# Patient Record
Sex: FEMALE | Race: WHITE | URBAN - METROPOLITAN AREA
[De-identification: names, ages, dates, MRNs, and addresses within clinical notes are randomized per-mention and may not be internally consistent; named-entity substitution may affect disease eponyms.]

---

## 2017-02-02 LAB
ABO/RH: NORMAL
ANTIBODY SCREEN: NORMAL
HEPATITIS C ANTIBODY INTERPRETATION: NORMAL

## 2017-02-03 LAB
BASOPHILS ABSOLUTE: 0 K/UL (ref 0–0.2)
BASOPHILS RELATIVE PERCENT: 0.4 %
EOSINOPHILS ABSOLUTE: 0.1 K/UL (ref 0–0.6)
EOSINOPHILS RELATIVE PERCENT: 2.1 %
HCT VFR BLD CALC: 34.9 % (ref 36–48)
HEMOGLOBIN: 11.9 G/DL (ref 12–16)
HEPATITIS B SURFACE ANTIGEN INTERPRETATION: ABNORMAL
HIV-1 AND HIV-2 ANTIBODIES: NORMAL
LYMPHOCYTES ABSOLUTE: 1.4 K/UL (ref 1–5.1)
LYMPHOCYTES RELATIVE PERCENT: 21.7 %
MCH RBC QN AUTO: 32.7 PG (ref 26–34)
MCHC RBC AUTO-ENTMCNC: 34.2 G/DL (ref 31–36)
MCV RBC AUTO: 95.6 FL (ref 80–100)
MONOCYTES ABSOLUTE: 0.3 K/UL (ref 0–1.3)
MONOCYTES RELATIVE PERCENT: 5.2 %
NEUTROPHILS ABSOLUTE: 4.6 K/UL (ref 1.7–7.7)
NEUTROPHILS RELATIVE PERCENT: 70.6 %
PDW BLD-RTO: 12.6 % (ref 12.4–15.4)
PLATELET # BLD: 202 K/UL (ref 135–450)
PMV BLD AUTO: 8.7 FL (ref 5–10.5)
RBC # BLD: 3.65 M/UL (ref 4–5.2)
RPR: ABNORMAL
RUBELLA ANTIBODY IGG: 50.9 IU/ML
WBC # BLD: 6.5 K/UL (ref 4–11)

## 2017-04-19 LAB
GLUCOSE CHALLENGE: 99 MG/DL
HCT VFR BLD CALC: 31.5 % (ref 36–48)
HEMOGLOBIN: 10.6 G/DL (ref 12–16)
MCH RBC QN AUTO: 32.6 PG (ref 26–34)
MCHC RBC AUTO-ENTMCNC: 33.8 G/DL (ref 31–36)
MCV RBC AUTO: 96.5 FL (ref 80–100)
PDW BLD-RTO: 13.7 % (ref 12.4–15.4)
PLATELET # BLD: 163 K/UL (ref 135–450)
PMV BLD AUTO: 8.9 FL (ref 5–10.5)
RBC # BLD: 3.26 M/UL (ref 4–5.2)
WBC # BLD: 6.4 K/UL (ref 4–11)

## 2017-08-07 PROBLEM — Z37.9 NORMAL LABOR: Status: ACTIVE | Noted: 2017-08-07

## 2018-03-14 ENCOUNTER — HOSPITAL ENCOUNTER (OUTPATIENT)
Dept: OTHER | Age: 33
Discharge: HOME | End: 2018-03-14
Attending: PSYCHIATRY & NEUROLOGY | Admitting: PSYCHIATRY & NEUROLOGY

## 2018-03-14 ENCOUNTER — OFFICE VISIT (OUTPATIENT)
Dept: NEUROLOGY | Age: 33
End: 2018-03-14

## 2018-03-14 VITALS
BODY MASS INDEX: 23.04 KG/M2 | HEIGHT: 68 IN | DIASTOLIC BLOOD PRESSURE: 74 MMHG | SYSTOLIC BLOOD PRESSURE: 115 MMHG | WEIGHT: 152 LBS | HEART RATE: 97 BPM

## 2018-03-14 DIAGNOSIS — H53.8 BLURRED VISION, LEFT EYE: ICD-10-CM

## 2018-03-14 DIAGNOSIS — H46.9 OPTIC NEURITIS, LEFT: Primary | ICD-10-CM

## 2018-03-14 DIAGNOSIS — H46.9 OPTIC NEURITIS, LEFT: ICD-10-CM

## 2018-03-14 LAB — VITAMIN B-12: 356 PG/ML (ref 211–911)

## 2018-03-14 PROCEDURE — 99245 OFF/OP CONSLTJ NEW/EST HI 55: CPT | Performed by: PSYCHIATRY & NEUROLOGY

## 2018-03-14 NOTE — PROGRESS NOTES
Shortness of breath         [x] Denies all of the above     Cardiovascular:   []  Chest pain    []  Exertional chest pressure/discomfort           [] Palpitations    []  Syncope     [x] Denies all of the above    Gastrointestinal:   []  Abdominal pain   []  Constipation    []  Diarrhea    []   Dysphagia                      [x] Denies all of the above    Genitourinary:      []  Frequency   []  Hematuria     []  Urinary incontinence           [x] Denies all of the above     Hematologic/lymphatic:  []  Bleeding    []  Easy bruising   []  Anemia  [x] Denies all of the above     Musculoskeletal:   [] Back pain       []  Myalgias    []  Neck pain           [x] Denies all of the above    Neurological: As noted in HPI    Behavioral/Psych:   [] Anxiety    []  Depression     []  Mood swings     [x] Denies all of the above     Endocrine:   []  Temperature intolerance     [] Fatigue      [x] Denies all of the above     Allergic/Immunologic:   [] Hay fever    [x] Denies all of the above     Past Medical History:   Diagnosis Date    Echogenic intracardiac focus of fetus on prenatal ultrasound     questionable on ultrasound. 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     Drug use: No    Sexual activity: Yes     Partners: Male     Other Topics Concern    None     Social History Narrative    None       PHYSICAL EXAMINATION:  /74   Pulse 97   Ht 5' 8\" (1.727 m)   Wt 152

## 2018-03-14 NOTE — LETTER
[facility] Neurology  [address] [address]  Phone: [phone]  Fax: [phone]    Romel Squires MD        March 14, 2018       Patient: Cheyanne Cantu   MR Number: [id number]   YOB: 1985   Date of Visit: 3/14/2018       Dear Dr. Deondre Bills: Thank you for the request for consultation for Cheyanne Cantu to me for the evaluation of Possible left-sided optic neuritis. Below are the relevant portions of my assessment and plan of care. NEUROLOGY CONSULTATION     Chief Complaint   Patient presents with    Neurologic Problem     Patient is here today to establish care. Patient says that on Saturday she started having vision problems with her right eye. Patient says that 10 years ago she had an abnormal mri that showed brain lesions, and she did not have any further problems, but now the problems are happening again. HISTORY OF PRESENT ILLNESS :    Cheyanne Cantu is a 28 y.o. female who is referred by Dr. Deondre Bills   History was obtained from patient  Patient was referred for evaluation of blurred vision in the left eye. Patient states that symptoms started about 5 days ago. She continues to have the blood vision in the left eye but it seems to be slightly improved in the last couple of days. Denies any symptoms on the right eye. Denies any focal weakness numbness vertigo or diplopia. Symptoms are acute in onset moderately severe and without any aggravating or relieving factors. She was seen by ophthalmologist and was found to have a normal exam but suspected of having them optic neuritis. It is interesting to note that about 10 years ago patient apparently had multiple neurological symptoms including numbness in the left side of the Oddi some cognitive problems dizziness and diplopia and had some abnormality in the MRI brain.   She was seen at [facility] HGB 11.6 08/07/2017    HCT 34.6 08/07/2017    MCV 93.6 08/07/2017    MCH 31.3 08/07/2017    MCHC 33.4 08/07/2017    RDW 13.5 08/07/2017     08/07/2017    MPV 9.4 08/07/2017       IMPRESSION :  Patient comes with blurred vision in the left eye and on ophthalmological testing was noted to have some nonspecific visual field defect. On my bedside examination patient is a normal visual fields to confrontation. However history is concerning for possible optic neuritis. Given the previous history of neurological symptoms about 10 years ago we should also consider the possibility of a demyelinating process like multiple sclerosis. We should rule out other conditions like lupus and sarcoidosis as well    RECOMMENDATIONS :  Discussed with patient in detail. I will get basic metabolic profile, RIVERA, O37, RPR and angiotensin-converting enzyme assay  I will get a visual evoked response study. I will get an MRI brain with and without contrast.  I will see her back in the next week and then consider IV steroids treatment depending on her symptoms  Thank you for this consultation        Please note a portion of this chart was generated using dragon dictation software. Although every effort was made to ensure the accuracy of this automated transcription, some errors in transcription may have occurred. If you have questions, please do not hesitate to call me. I look forward to following Josué Cote along with you.     Sincerely,        Hunter León MD     providers:  [place], [facility] [address] Mail

## 2018-03-15 LAB
ANA INTERPRETATION: ABNORMAL
ANA TITER: ABNORMAL
ANTI-NUCLEAR ANTIBODY (ANA): POSITIVE
RPR: NORMAL

## 2018-03-16 LAB — ANGIOTENSIN CONVERTING ENZYME: 16 U/L (ref 9–67)

## 2018-03-20 ENCOUNTER — HOSPITAL ENCOUNTER (OUTPATIENT)
Dept: MRI IMAGING | Age: 33
Discharge: HOME | End: 2018-03-20

## 2018-03-20 DIAGNOSIS — H46.9 OPTIC NEURITIS, LEFT: ICD-10-CM

## 2018-03-20 DIAGNOSIS — H46.9 OPTIC NEURITIS: ICD-10-CM

## 2018-03-20 RX ORDER — SODIUM CHLORIDE 0.9 % (FLUSH) 0.9 %
10 SYRINGE (ML) INJECTION ONCE
Status: COMPLETED | OUTPATIENT
Start: 2018-03-20 | End: 2018-03-20

## 2018-03-20 RX ADMIN — Medication 10 ML: at 16:34

## 2018-03-22 ENCOUNTER — OFFICE VISIT (OUTPATIENT)
Dept: NEUROLOGY | Age: 33
End: 2018-03-22

## 2018-03-22 VITALS
WEIGHT: 151 LBS | BODY MASS INDEX: 22.88 KG/M2 | HEART RATE: 98 BPM | HEIGHT: 68 IN | DIASTOLIC BLOOD PRESSURE: 72 MMHG | SYSTOLIC BLOOD PRESSURE: 115 MMHG

## 2018-03-22 DIAGNOSIS — H46.9 LEFT OPTIC NEURITIS: Primary | ICD-10-CM

## 2018-03-22 DIAGNOSIS — R90.82 WHITE MATTER ABNORMALITY ON MRI OF BRAIN: ICD-10-CM

## 2018-03-22 PROCEDURE — 99214 OFFICE O/P EST MOD 30 MIN: CPT | Performed by: PSYCHIATRY & NEUROLOGY

## 2018-03-22 PROCEDURE — G8484 FLU IMMUNIZE NO ADMIN: HCPCS | Performed by: PSYCHIATRY & NEUROLOGY

## 2018-03-22 PROCEDURE — G8420 CALC BMI NORM PARAMETERS: HCPCS | Performed by: PSYCHIATRY & NEUROLOGY

## 2018-03-22 PROCEDURE — 4004F PT TOBACCO SCREEN RCVD TLK: CPT | Performed by: PSYCHIATRY & NEUROLOGY

## 2018-03-22 PROCEDURE — G8427 DOCREV CUR MEDS BY ELIG CLIN: HCPCS | Performed by: PSYCHIATRY & NEUROLOGY

## 2018-04-02 ENCOUNTER — TELEPHONE (OUTPATIENT)
Dept: NEUROLOGY | Age: 33
End: 2018-04-02

## 2020-03-03 LAB
HPV COMMENT: NORMAL
HPV TYPE 16: NOT DETECTED
HPV TYPE 18: NOT DETECTED
HPVOH (OTHER TYPES): NOT DETECTED

## 2021-06-29 ENCOUNTER — OFFICE VISIT (OUTPATIENT)
Dept: FAMILY MEDICINE CLINIC | Age: 36
End: 2021-06-29
Payer: COMMERCIAL

## 2021-06-29 VITALS
HEIGHT: 68 IN | HEART RATE: 76 BPM | OXYGEN SATURATION: 98 % | WEIGHT: 162 LBS | BODY MASS INDEX: 24.55 KG/M2 | DIASTOLIC BLOOD PRESSURE: 60 MMHG | SYSTOLIC BLOOD PRESSURE: 100 MMHG

## 2021-06-29 DIAGNOSIS — D36.7 DERMOID CYST OF NECK: ICD-10-CM

## 2021-06-29 DIAGNOSIS — Z76.89 ENCOUNTER TO ESTABLISH CARE: Primary | ICD-10-CM

## 2021-06-29 DIAGNOSIS — F41.9 ANXIETY: ICD-10-CM

## 2021-06-29 DIAGNOSIS — Z71.6 ENCOUNTER FOR TOBACCO USE CESSATION COUNSELING: ICD-10-CM

## 2021-06-29 DIAGNOSIS — Z72.0 TOBACCO ABUSE: ICD-10-CM

## 2021-06-29 DIAGNOSIS — F32.0 CURRENT MILD EPISODE OF MAJOR DEPRESSIVE DISORDER WITHOUT PRIOR EPISODE (HCC): ICD-10-CM

## 2021-06-29 PROBLEM — Z37.9 NORMAL LABOR: Status: RESOLVED | Noted: 2017-08-07 | Resolved: 2021-06-29

## 2021-06-29 PROCEDURE — 99204 OFFICE O/P NEW MOD 45 MIN: CPT | Performed by: NURSE PRACTITIONER

## 2021-06-29 RX ORDER — DROSPIRENONE 4 MG/1
TABLET, FILM COATED ORAL
COMMUNITY
Start: 2021-05-25

## 2021-06-29 RX ORDER — HYDROXYZINE HYDROCHLORIDE 25 MG/1
TABLET, FILM COATED ORAL
Qty: 30 TABLET | Refills: 5 | Status: SHIPPED | OUTPATIENT
Start: 2021-06-29

## 2021-06-29 SDOH — ECONOMIC STABILITY: FOOD INSECURITY: WITHIN THE PAST 12 MONTHS, THE FOOD YOU BOUGHT JUST DIDN'T LAST AND YOU DIDN'T HAVE MONEY TO GET MORE.: NEVER TRUE

## 2021-06-29 SDOH — ECONOMIC STABILITY: FOOD INSECURITY: WITHIN THE PAST 12 MONTHS, YOU WORRIED THAT YOUR FOOD WOULD RUN OUT BEFORE YOU GOT MONEY TO BUY MORE.: NEVER TRUE

## 2021-06-29 SDOH — ECONOMIC STABILITY: TRANSPORTATION INSECURITY
IN THE PAST 12 MONTHS, HAS LACK OF TRANSPORTATION KEPT YOU FROM MEETINGS, WORK, OR FROM GETTING THINGS NEEDED FOR DAILY LIVING?: NO

## 2021-06-29 SDOH — ECONOMIC STABILITY: TRANSPORTATION INSECURITY
IN THE PAST 12 MONTHS, HAS THE LACK OF TRANSPORTATION KEPT YOU FROM MEDICAL APPOINTMENTS OR FROM GETTING MEDICATIONS?: NO

## 2021-06-29 ASSESSMENT — ENCOUNTER SYMPTOMS
COUGH: 0
SHORTNESS OF BREATH: 0
SINUS PAIN: 0
CONSTIPATION: 0
NAUSEA: 0
SINUS PRESSURE: 0
BACK PAIN: 0
DIARRHEA: 0
COLOR CHANGE: 0
CHEST TIGHTNESS: 0
EYE DISCHARGE: 0
ABDOMINAL PAIN: 0
ABDOMINAL DISTENTION: 0

## 2021-06-29 ASSESSMENT — ANXIETY QUESTIONNAIRES
6. BECOMING EASILY ANNOYED OR IRRITABLE: 1-SEVERAL DAYS
4. TROUBLE RELAXING: 1-SEVERAL DAYS
3. WORRYING TOO MUCH ABOUT DIFFERENT THINGS: 1-SEVERAL DAYS
1. FEELING NERVOUS, ANXIOUS, OR ON EDGE: 0-NOT AT ALL
5. BEING SO RESTLESS THAT IT IS HARD TO SIT STILL: 1-SEVERAL DAYS
2. NOT BEING ABLE TO STOP OR CONTROL WORRYING: 0-NOT AT ALL
GAD7 TOTAL SCORE: 5
7. FEELING AFRAID AS IF SOMETHING AWFUL MIGHT HAPPEN: 1-SEVERAL DAYS

## 2021-06-29 ASSESSMENT — PATIENT HEALTH QUESTIONNAIRE - PHQ9
SUM OF ALL RESPONSES TO PHQ9 QUESTIONS 1 & 2: 2
SUM OF ALL RESPONSES TO PHQ QUESTIONS 1-9: 2
2. FEELING DOWN, DEPRESSED OR HOPELESS: 1
1. LITTLE INTEREST OR PLEASURE IN DOING THINGS: 1

## 2021-06-29 ASSESSMENT — SOCIAL DETERMINANTS OF HEALTH (SDOH): HOW HARD IS IT FOR YOU TO PAY FOR THE VERY BASICS LIKE FOOD, HOUSING, MEDICAL CARE, AND HEATING?: NOT HARD AT ALL

## 2021-06-29 NOTE — PROGRESS NOTES
Date of Service:  2021    Jesica Bateman (:  1985) is a 28 y.o. female, here for evaluation of the following medical concerns:    Chief Complaint   Patient presents with   Zheng Priest Doctor     new patient- depression , blood work- had coffee with milk         HPI     Patient here today to establish care. Previous PCP in [facility]. Family comes to this office. 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 PHQ score 2  GAD7 score 5    Overall patient feeling better than when she had made initial appt. She has been working on [employer] and making more reasonable expectations of herself. Pt tries to get out of the house every weekend now that Jason is better and says this has helped her overall mood as well. Pt is willing to try medication if it could help but is not opposed to waiting and just receiving more information. Pt has never done counseling before. Depression  Patient complains of depression. She complains of anhedonia, depressed mood, feelings of worthlessness/guilt and hopelessness. Onset was approximately 8 months ago but feeling slightly better over the past month. Symptoms have been gradually improving since that time. Current symptoms include: anhedonia, depressed mood, difficulty concentrating, fatigue, feelings of worthlessness/guilt and hopelessness. Patient denies hypersomnia, impaired memory, insomnia, psychomotor agitation, psychomotor retardation, recurrent thoughts of death, suicidal attempt, suicidal thoughts with specific plan, suicidal thoughts without plan, weight gain and weight loss. Family history significant for heart disease. Possible organic causes contributing are: none. Risk factors: negative life event COVID. Previous treatment includes none. She complains of the following side effects from the treatment: none.       Review of Systems   Constitutional: Negative for activity change, appetite change, fatigue, fever and unexpected weight change. HENT: Negative for congestion, ear pain, sinus pressure and sinus pain. Eyes: Negative for discharge and visual disturbance. Respiratory: Negative for cough, chest tightness and shortness of breath. Cardiovascular: Negative for chest pain, palpitations and leg swelling. Gastrointestinal: Negative for abdominal distention, abdominal pain, constipation, diarrhea and nausea. Endocrine: Negative for cold intolerance, heat intolerance, polydipsia, polyphagia and polyuria. Genitourinary: Negative for decreased urine volume, difficulty urinating, dysuria, flank pain, frequency and urgency. Musculoskeletal: Negative for arthralgias, back pain, gait problem, joint swelling, myalgias and neck pain. Skin: Negative for color change, rash and wound. Allergic/Immunologic: Negative for food allergies and immunocompromised state. Neurological: Negative for dizziness, tremors, speech difficulty, weakness, light-headedness, numbness and headaches. Hematological: Negative for adenopathy. Does not bruise/bleed easily. Psychiatric/Behavioral: Negative for confusion, decreased concentration, self-injury, sleep disturbance and suicidal ideas. The patient is nervous/anxious. Prior to Visit Medications    Medication Sig Taking? Authorizing Provider   SLYND 4 MG TABS  Yes Historical Provider, MD   hydrOXYzine (ATARAX) 25 MG tablet Take half to one tablet every 8 hours as needed for anxiety Yes Kennith Frankel, APRN - CNP   ibuprofen (ADVIL;MOTRIN) 800 MG tablet Take 1 tablet by mouth every 8 hours as needed for Pain  Patient not taking: Reported on 6/29/2021  Edilia Wills MD        No Known Allergies    Past Medical History:   Diagnosis Date    Anxiety     Depression     Echogenic intracardiac focus of fetus on prenatal ultrasound     questionable on ultrasound.    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    Normal labor 8/7/2017       Past Surgical History:   Procedure Laterality Date    SKIN BIOPSY      WISDOM TOOTH EXTRACTION         Social History     Tobacco Use    Smoking status: Current Some Day Smoker     Packs/day: 0.10     Years: 9.00     Pack years: 0.90     Types: Cigarettes    Smokeless tobacco: Never Used   Vaping Use    Vaping Use: Former    Substances: Unknown   Substance Use Topics    Alcohol use: Yes     Alcohol/week: 2.0 standard drinks     Types: 2 Cans of beer per week     Comment: .1-2 drinks twice weekly    Drug use: No        Family History   Problem Relation Age of Onset    Diabetes Maternal Grandmother         \"low blood sugars\"    Heart Disease Maternal Grandfather     No Known Problems Mother     High Blood Pressure Sister     Other Sister         autoimmune condition- scleroderma       Vitals:    06/29/21 0728   BP: 100/60   Site: Right Upper Arm   Position: Sitting   Cuff Size: Medium Adult   Pulse: 76   SpO2: 98%   Weight: 162 lb (73.5 kg)   Height: 5' 8\" (1.727 m)     Estimated body mass index is 24.63 kg/m² as calculated from the following:    Height as of this encounter: 5' 8\" (1.727 m). Weight as of this encounter: 162 lb (73.5 kg). Physical Exam  Vitals reviewed. Constitutional:       General: She is awake. Appearance: Normal appearance. She is well-developed, well-groomed and normal weight. She is not ill-appearing. HENT:      Head: Normocephalic and atraumatic. Right Ear: Hearing, tympanic membrane, ear canal and external ear normal.      Left Ear: Hearing, tympanic membrane, ear canal and external ear normal.      Nose: Nose normal.      Mouth/Throat:      Lips: Pink. Mouth: Mucous membranes are moist.      Pharynx: Oropharynx is clear. Eyes:      General: Lids are normal.      Extraocular Movements: Extraocular movements intact. Conjunctiva/sclera: Conjunctivae normal.      Pupils: Pupils are equal, round, and reactive to light. Neck:      Thyroid: No thyromegaly. Vascular: No carotid bruit. Comments: Small cyst like structure on back right side of neck, mobile, rubbery in consistency  Cardiovascular:      Rate and Rhythm: Normal rate. Pulses:           Carotid pulses are 2+ on the right side and 2+ on the left side. Radial pulses are 2+ on the right side and 2+ on the left side. Posterior tibial pulses are 2+ on the right side and 2+ on the left side. Heart sounds: Normal heart sounds, S1 normal and S2 normal. No murmur heard. Pulmonary:      Effort: Pulmonary effort is normal.      Breath sounds: Normal breath sounds. Abdominal:      General: Bowel sounds are normal. There is no abdominal bruit. Palpations: Abdomen is soft. Tenderness: There is no abdominal tenderness. Genitourinary:     Comments: Deferred  Musculoskeletal:         General: Normal range of motion. Cervical back: Full passive range of motion without pain, normal range of motion and neck supple. Right lower leg: No edema. Left lower leg: No edema. Lymphadenopathy:      Head:      Right side of head: No submental, submandibular, tonsillar, preauricular, posterior auricular or occipital adenopathy. Left side of head: No submental, submandibular, tonsillar, preauricular, posterior auricular or occipital adenopathy. Cervical: No cervical adenopathy. Right cervical: No superficial, deep or posterior cervical adenopathy. Left cervical: No superficial, deep or posterior cervical adenopathy. Upper Body:      Right upper body: No supraclavicular adenopathy. Left upper body: No supraclavicular adenopathy. Skin:     General: Skin is warm and dry. Capillary Refill: Capillary refill takes less than 2 seconds. Neurological:      General: No focal deficit present. Mental Status: She is alert and oriented to person, place, and time. Mental status is at baseline. Sensory: Sensation is intact. Motor: Motor function is intact. Coordination: Coordination is intact. Gait: Gait is intact. Psychiatric:         Attention and Perception: Attention and perception normal.         Mood and Affect: Affect normal. Mood is anxious. Speech: Speech normal.         Behavior: Behavior normal. Behavior is cooperative. Thought Content: Thought content normal.         Cognition and Memory: Cognition and memory normal.         Judgment: Judgment normal.         ASSESSMENT/PLAN:  1. Encounter to establish care   Discussed office policies/practices/provider team   Reviewed medical, social, family history and medications   Discussed MyChart activation and usage    2. Anxiety  -     hydrOXYzine (ATARAX) 25 MG tablet; Take half to one tablet every 8 hours as needed for anxiety, Disp-30 tablet, R-5Normal   Pt does not want a medication she needs to take daily. Discussed buspar or zoloft or lexapro if she decides she would be willing to take something daily   Recommend counseling- needs list. Encouraged to call HR and see if it is offered through employer   Physical activity 150 minutes weekly recommended     3. Current mild episode of major depressive disorder without prior episode ([facility] [address]   Pt does not want daily medication   Feels it is improving    Counseling recommended   Information printed and reviewed   Self care discussed   Low concern for suicide    4. Tobacco abuse   Pt is daily smoker, only quit while pregnant, no interest in quitting at this time    5. Encounter for tobacco use cessation counseling   Discussed quitting and pt not interested at this time    6.  Dermoid cyst of neck    Cyst noted on back right side of neck   Present 1-2 years per pt   Consider gen sx referral for removal when pt interested    Care Gaps Addressed  PNA vaccine recommended- smoker  COVID vaccine recommended  PAP last one- [facility]    I have reviewed patient's pertinent medical history, relevant laboratory and imaging studies, and past/future health maintenance. Discussed with the patient the importance of adhering to their current medication regimen as directed. Advised the patient that they should continue to work on eating a healthy balanced diet and staying active by exercising within their personal limits. Orders as listed above. Patient was advised to keep future appointments with their respective specialty care team(s). Patient had the opportunity to ask questions, all of which were answered to the best of my ability and with patient satisfaction. Patient understands and is agreeable with the care plan following today's visit. Patient is to schedule an appointment for any new or worsening symptoms. Go to ER for significant shortness of breath, chest pain, or uncontrolled pain or fever. I discussed with patient the risk and benefits of any medications that were prescribed today. I verified that the patient understands their medications, labs, and/or procedures. The patient is doing well with current medication regimen and does not have any barriers to adherence. The patient's self-management abilities are good. Return in about 3 months (around 9/29/2021) for Physical Exam and Labs. An  electronic signature was used to authenticate this note.     --Anthony Batista, ABIMAEL - CNP on 6/29/2021 at 9:46 AM

## 2021-07-07 ENCOUNTER — OFFICE VISIT (OUTPATIENT)
Dept: FAMILY MEDICINE CLINIC | Age: 36
End: 2021-07-07
Payer: COMMERCIAL

## 2021-07-07 VITALS
DIASTOLIC BLOOD PRESSURE: 70 MMHG | HEART RATE: 80 BPM | WEIGHT: 161 LBS | SYSTOLIC BLOOD PRESSURE: 102 MMHG | OXYGEN SATURATION: 98 % | HEIGHT: 68 IN | BODY MASS INDEX: 24.4 KG/M2

## 2021-07-07 DIAGNOSIS — H69.81 EUSTACHIAN TUBE DYSFUNCTION, RIGHT: ICD-10-CM

## 2021-07-07 DIAGNOSIS — H93.8X1 SENSATION OF FULLNESS IN RIGHT EAR: Primary | ICD-10-CM

## 2021-07-07 PROCEDURE — 99213 OFFICE O/P EST LOW 20 MIN: CPT | Performed by: NURSE PRACTITIONER

## 2021-07-07 ASSESSMENT — ENCOUNTER SYMPTOMS
SINUS PRESSURE: 0
SINUS PAIN: 0
DIARRHEA: 0
COUGH: 0
EYE DISCHARGE: 0
NAUSEA: 0
ABDOMINAL PAIN: 0
CONSTIPATION: 0
ABDOMINAL DISTENTION: 0
SHORTNESS OF BREATH: 0
COLOR CHANGE: 0
FACIAL SWELLING: 0
BACK PAIN: 0
VOICE CHANGE: 0
CHEST TIGHTNESS: 0
RHINORRHEA: 0
TROUBLE SWALLOWING: 0
SORE THROAT: 0

## 2021-07-07 NOTE — PROGRESS NOTES
Date of Service:  2021    Marisa Levine (:  1985) is a 28 y.o. female, here for evaluation of the following medical concerns:    Chief Complaint   Patient presents with    Ear Fullness     PT HAS BEEN HAVING A FULLNESS FEELING IN HER RIGHT EAR SINCE SATURDAY         HPI     Ear Fullness  Pt reports fullness in right ear x 5 days. Alleviating factors- advil cold and sinus. Aggravating factors- in smaller rooms feels increased pressure, loud sounds. Treatment- advil cold and sinus. History of ear infections as a child. Pt has a pool and goes swimming fairly often. No recent fever or head colds. No recent sick contacts. Review of Systems   Constitutional: Negative for activity change, appetite change, fatigue, fever and unexpected weight change. HENT: Positive for ear pain (right ear fullness). Negative for congestion, dental problem, drooling, ear discharge, facial swelling, hearing loss, mouth sores, nosebleeds, postnasal drip, rhinorrhea, sinus pressure, sinus pain, sneezing, sore throat, tinnitus, trouble swallowing and voice change. Eyes: Negative for discharge and visual disturbance. Respiratory: Negative for cough, chest tightness and shortness of breath. Cardiovascular: Negative for chest pain, palpitations and leg swelling. Gastrointestinal: Negative for abdominal distention, abdominal pain, constipation, diarrhea and nausea. Endocrine: Negative for cold intolerance, heat intolerance, polydipsia, polyphagia and polyuria. Genitourinary: Negative for decreased urine volume, difficulty urinating, dysuria, flank pain, frequency and urgency. Musculoskeletal: Negative for arthralgias, back pain, gait problem, joint swelling, myalgias and neck pain. Skin: Negative for color change, rash and wound. Allergic/Immunologic: Negative for food allergies and immunocompromised state.    Neurological: Negative for dizziness, tremors, speech difficulty, weakness, light-headedness, numbness and headaches. Hematological: Negative for adenopathy. Does not bruise/bleed easily. Psychiatric/Behavioral: Negative for confusion, decreased concentration, self-injury, sleep disturbance and suicidal ideas. The patient is not nervous/anxious. Prior to Visit Medications    Medication Sig Taking? Authorizing Provider   SLYND 4 MG TABS  Yes Historical Provider, MD   hydrOXYzine (ATARAX) 25 MG tablet Take half to one tablet every 8 hours as needed for anxiety Yes Tiburcio Torres, APRN - CNP        Social History     Tobacco Use    Smoking status: Current Some Day Smoker     Packs/day: 0.10     Years: 9.00     Pack years: 0.90     Types: Cigarettes    Smokeless tobacco: Never Used   Substance Use Topics    Alcohol use: Yes     Alcohol/week: 2.0 standard drinks     Types: 2 Cans of beer per week     Comment: .1-2 drinks twice weekly        Vitals:    07/07/21 0937   BP: 102/70   Site: Right Upper Arm   Position: Sitting   Cuff Size: Medium Adult   Pulse: 80   SpO2: 98%   Weight: 161 lb (73 kg)   Height: 5' 8\" (1.727 m)     Estimated body mass index is 24.48 kg/m² as calculated from the following:    Height as of this encounter: 5' 8\" (1.727 m). Weight as of this encounter: 161 lb (73 kg). Physical Exam  Vitals reviewed. Constitutional:       General: She is awake. Appearance: Normal appearance. She is well-developed, well-groomed and normal weight. She is not ill-appearing. HENT:      Head: Normocephalic and atraumatic. Right Ear: Hearing, tympanic membrane, ear canal and external ear normal.      Left Ear: Hearing, tympanic membrane, ear canal and external ear normal.      Nose: Nose normal.      Mouth/Throat:      Lips: Pink. Mouth: Mucous membranes are moist.      Pharynx: Oropharynx is clear. Eyes:      General: Lids are normal.      Extraocular Movements: Extraocular movements intact.       Conjunctiva/sclera: Conjunctivae normal.      Pupils: Pupils are Coordination: Coordination is intact. Gait: Gait is intact. Psychiatric:         Attention and Perception: Attention and perception normal.         Mood and Affect: Mood and affect normal.         Speech: Speech normal.         Behavior: Behavior normal. Behavior is cooperative. Thought Content: Thought content normal.         Cognition and Memory: Cognition and memory normal.         Judgment: Judgment normal.         ASSESSMENT/PLAN:  1. Sensation of fullness in right ear  2. Eustachian tube dysfunction, right    Recommend sudafed or sinus medication x 1 week. Could try allergy medication as well or instead of sudafed/sinus meds. Also recommend flonase over the counter nasal spray daily   If persists >1 week despite above treatments, would try steroid taper        Care Gaps Addressed  PNA vaccine and COVID vaccine recommended    I have reviewed patient's pertinent medical history, relevant laboratory and imaging studies, and past/future health maintenance. Discussed with the patient the importance of adhering to their current medication regimen as directed. Advised the patient that they should continue to work on eating a healthy balanced diet and staying active by exercising within their personal limits. Orders as listed above. Patient was advised to keep future appointments with their respective specialty care team(s). Patient had the opportunity to ask questions, all of which were answered to the best of my ability and with patient satisfaction. Patient understands and is agreeable with the care plan following today's visit. Patient is to schedule an appointment for any new or worsening symptoms. Go to ER for significant shortness of breath, chest pain, or uncontrolled pain or fever. I discussed with patient the risk and benefits of any medications that were prescribed today. I verified that the patient understands their medications, labs, and/or procedures.  The patient is doing well with current medication regimen and does not have any barriers to adherence. The patient's self-management abilities are good. Return in about 3 months (around 10/7/2021) for Physical Exam and Labs. An electronic signature was used to authenticate this note.     --ABIMAEL Thomas - CNP on 7/7/2021 at 10:02 AM

## 2021-07-07 NOTE — PATIENT INSTRUCTIONS
Recommend sudafed or sinus medication x 1 week. Could try allergy medication as well or instead of sudafed/sinus meds. Also recommend flonase over the counter nasal spray daily      Patient Education        Eustachian Tube Problems: Care Instructions  Your Care Instructions     The eustachian (say \"you-STAY-shee-un\") tubes run between the inside of the ears and the throat. They keep air pressure stable in the ears. If your eustachian tubes become blocked, the air pressure in your ears changes. The fluids from a cold can clog eustachian tubes, causing pain in the ears. A quick change in air pressure can cause eustachian tubes to close up. This might happen when an airplane changes altitude or when a  goes up or down underwater. Eustachian tube problems often clear up on their own or after antibiotic treatment. If your tubes continue to be blocked, you may need surgery. Follow-up care is a key part of your treatment and safety. Be sure to make and go to all appointments, and call your doctor if you are having problems. It's also a good idea to know your test results and keep a list of the medicines you take. How can you care for yourself at home? · To ease ear pain, apply a warm washcloth or a heating pad set on low. There may be some drainage from the ear when the heat melts earwax. Put a cloth between the heat source and your skin. Do not use a heating pad with children. · If your doctor prescribed antibiotics, take them as directed. Do not stop taking them just because you feel better. You need to take the full course of antibiotics. · Your doctor may recommend over-the-counter medicine. Be safe with medicines. Oral or nasal decongestants may relieve ear pain. Avoid decongestants that are combined with antihistamines, which tend to cause more blockage. But if allergies seem to be the problem, your doctor may recommend a combination. Be careful with cough and cold medicines.  Don't give them to children younger than 6, because they don't work for children that age and can even be harmful. For children 6 and older, always follow all the instructions carefully. Make sure you know how much medicine to give and how long to use it. And use the dosing device if one is included. When should you call for help? Call your doctor now or seek immediate medical care if:    · You develop sudden, complete hearing loss.     · You have severe pain or feel dizzy.     · You have new or increasing pus or blood draining from your ear.     · You have redness, swelling, or pain around or behind the ear. Watch closely for changes in your health, and be sure to contact your doctor if:    · You do not get better after 2 weeks.     · You have any new symptoms, such as itching or a feeling of fullness in the ear. Where can you learn more? Go to https://[employer]. org and sign in to your [employer] account. Enter Y822 in the [employer] box to learn more about \"Eustachian Tube Problems: Care Instructions. \"     If you do not have an account, please click on the \"Sign Up Now\" link. Current as of: December 2, 2020               Content Version: 12.9  © 2006-2021 [employer]. Care instructions adapted under license by [facility] ([facility]). If you have questions about a medical condition or this instruction, always ask your healthcare professional. [place] [address] any warranty or liability for your use of this information.

## 2023-03-21 ENCOUNTER — TELEPHONE (OUTPATIENT)
Dept: FAMILY MEDICINE CLINIC | Age: 38
End: 2023-03-21

## 2023-03-21 DIAGNOSIS — F41.9 ANXIETY: ICD-10-CM

## 2023-03-21 DIAGNOSIS — R74.8 LOW SERUM ALKALINE PHOSPHATASE: Primary | ICD-10-CM

## 2023-03-21 DIAGNOSIS — Z00.00 WELL ADULT EXAM: ICD-10-CM

## 2023-03-21 DIAGNOSIS — D64.9 ANEMIA, UNSPECIFIED TYPE: ICD-10-CM

## 2023-03-21 DIAGNOSIS — Z13.220 ENCOUNTER FOR SCREENING FOR LIPID DISORDER: ICD-10-CM

## 2023-03-21 RX ORDER — HYDROXYZINE HYDROCHLORIDE 25 MG/1
TABLET, FILM COATED ORAL
Qty: 30 TABLET | Refills: 5 | OUTPATIENT
Start: 2023-03-21

## 2023-03-21 NOTE — TELEPHONE ENCOUNTER
----- Message from Juan Crystal sent at 3/21/2023 11:08 AM EDT -----  Subject: Referral Request    Reason for referral request? blood work - annual physical   Provider patient wants to be referred to(if known):     Provider Phone Number(if known): Additional Information for Provider?  Patient has appt scheduled on   4/12/2023 & wants to get lab work completed before appt.   ---------------------------------------------------------------------------  --------------  [employer]    [phone]; OK to leave message on voicemail  ---------------------------------------------------------------------------  --------------

## 2023-03-21 NOTE — TELEPHONE ENCOUNTER
LV 6/29/21 WITH CB FOR NEW PT NV NONE    Return in about 3 months (around 9/29/2021) for Physical Exam and Labs.   PLEASE CALL PT TO SCHEDULE APPT

## 2023-03-21 NOTE — TELEPHONE ENCOUNTER
Please order fasting labs for pt.  Scheduled lab appt for 04/10 before her appt 04/12 requested by CB after pt requested refill

## 2023-04-12 PROBLEM — R76.8 POSITIVE ANA (ANTINUCLEAR ANTIBODY): Status: ACTIVE | Noted: 2023-04-12

## 2023-04-12 PROBLEM — E78.00 PURE HYPERCHOLESTEROLEMIA: Status: ACTIVE | Noted: 2023-04-12

## 2023-04-12 PROBLEM — R53.82 CHRONIC FATIGUE: Status: ACTIVE | Noted: 2023-04-12

## 2023-04-27 ENCOUNTER — OFFICE VISIT (OUTPATIENT)
Dept: FAMILY MEDICINE CLINIC | Age: 38
End: 2023-04-27
Payer: COMMERCIAL

## 2023-04-27 VITALS
BODY MASS INDEX: 26.52 KG/M2 | HEART RATE: 75 BPM | DIASTOLIC BLOOD PRESSURE: 72 MMHG | OXYGEN SATURATION: 100 % | SYSTOLIC BLOOD PRESSURE: 106 MMHG | HEIGHT: 68 IN | WEIGHT: 175 LBS

## 2023-04-27 DIAGNOSIS — J02.9 SORE THROAT: Primary | ICD-10-CM

## 2023-04-27 PROCEDURE — 99213 OFFICE O/P EST LOW 20 MIN: CPT

## 2023-04-27 RX ORDER — FAMOTIDINE 20 MG/1
20 TABLET, FILM COATED ORAL 2 TIMES DAILY
Qty: 60 TABLET | Refills: 5 | Status: SHIPPED | OUTPATIENT
Start: 2023-04-27

## 2023-04-27 RX ORDER — LORATADINE 10 MG/1
10 TABLET ORAL DAILY
Qty: 30 TABLET | Refills: 5 | Status: SHIPPED | OUTPATIENT
Start: 2023-04-27

## 2023-04-27 ASSESSMENT — ENCOUNTER SYMPTOMS
VOMITING: 0
DIARRHEA: 0
EYE ITCHING: 0
TROUBLE SWALLOWING: 1
EYE REDNESS: 0
ABDOMINAL DISTENTION: 0
WHEEZING: 0
CONSTIPATION: 0
COUGH: 0
ABDOMINAL PAIN: 0
ANAL BLEEDING: 0
SINUS PAIN: 0
SHORTNESS OF BREATH: 0
SORE THROAT: 1
BACK PAIN: 0
NAUSEA: 0
CHEST TIGHTNESS: 0
RECTAL PAIN: 0
RHINORRHEA: 0
SINUS PRESSURE: 1
COLOR CHANGE: 0
EYE DISCHARGE: 0
BLOOD IN STOOL: 0

## 2023-04-27 NOTE — PATIENT INSTRUCTIONS

## 2023-04-27 NOTE — PROGRESS NOTES
this chart was generated using dragon dictation software. Although every effort was made to ensure the accuracy of this automated transcription, some errors in transcription may have occurred.

## 2025-04-02 ENCOUNTER — OFFICE VISIT (OUTPATIENT)
Dept: FAMILY MEDICINE CLINIC | Age: 40
End: 2025-04-02
Payer: COMMERCIAL

## 2025-04-02 VITALS
WEIGHT: 176 LBS | SYSTOLIC BLOOD PRESSURE: 112 MMHG | BODY MASS INDEX: 26.67 KG/M2 | HEIGHT: 68 IN | DIASTOLIC BLOOD PRESSURE: 72 MMHG

## 2025-04-02 DIAGNOSIS — L02.212 ABSCESS OF BACK: Primary | ICD-10-CM

## 2025-04-02 PROCEDURE — 99214 OFFICE O/P EST MOD 30 MIN: CPT

## 2025-04-02 PROCEDURE — 10061 I&D ABSCESS COMP/MULTIPLE: CPT

## 2025-04-02 RX ORDER — DOXYCYCLINE HYCLATE 100 MG
100 TABLET ORAL 2 TIMES DAILY
Qty: 20 TABLET | Refills: 0 | Status: SHIPPED | OUTPATIENT
Start: 2025-04-02 | End: 2025-04-12

## 2025-04-02 RX ORDER — NORGESTREL 0.07 MG/1
1 TABLET ORAL DAILY
COMMUNITY
Start: 2024-10-09

## 2025-04-02 SDOH — ECONOMIC STABILITY: FOOD INSECURITY: WITHIN THE PAST 12 MONTHS, YOU WORRIED THAT YOUR FOOD WOULD RUN OUT BEFORE YOU GOT MONEY TO BUY MORE.: NEVER TRUE

## 2025-04-02 SDOH — ECONOMIC STABILITY: FOOD INSECURITY: WITHIN THE PAST 12 MONTHS, THE FOOD YOU BOUGHT JUST DIDN'T LAST AND YOU DIDN'T HAVE MONEY TO GET MORE.: NEVER TRUE

## 2025-04-02 ASSESSMENT — PATIENT HEALTH QUESTIONNAIRE - PHQ9
SUM OF ALL RESPONSES TO PHQ QUESTIONS 1-9: 0
8. MOVING OR SPEAKING SO SLOWLY THAT OTHER PEOPLE COULD HAVE NOTICED. OR THE OPPOSITE, BEING SO FIGETY OR RESTLESS THAT YOU HAVE BEEN MOVING AROUND A LOT MORE THAN USUAL: NOT AT ALL
6. FEELING BAD ABOUT YOURSELF - OR THAT YOU ARE A FAILURE OR HAVE LET YOURSELF OR YOUR FAMILY DOWN: NOT AT ALL
SUM OF ALL RESPONSES TO PHQ QUESTIONS 1-9: 0
4. FEELING TIRED OR HAVING LITTLE ENERGY: NOT AT ALL
7. TROUBLE CONCENTRATING ON THINGS, SUCH AS READING THE NEWSPAPER OR WATCHING TELEVISION: NOT AT ALL
9. THOUGHTS THAT YOU WOULD BE BETTER OFF DEAD, OR OF HURTING YOURSELF: NOT AT ALL
1. LITTLE INTEREST OR PLEASURE IN DOING THINGS: NOT AT ALL
5. POOR APPETITE OR OVEREATING: NOT AT ALL
SUM OF ALL RESPONSES TO PHQ QUESTIONS 1-9: 0
2. FEELING DOWN, DEPRESSED OR HOPELESS: NOT AT ALL
3. TROUBLE FALLING OR STAYING ASLEEP: NOT AT ALL
SUM OF ALL RESPONSES TO PHQ QUESTIONS 1-9: 0
10. IF YOU CHECKED OFF ANY PROBLEMS, HOW DIFFICULT HAVE THESE PROBLEMS MADE IT FOR YOU TO DO YOUR WORK, TAKE CARE OF THINGS AT HOME, OR GET ALONG WITH OTHER PEOPLE: NOT DIFFICULT AT ALL

## 2025-04-02 NOTE — PROGRESS NOTES
Chief Complaint   Patient presents with    Cyst     PT C/O CYST LIKE STRUCTURE ON HER BACK THAT HAS BEEN THERE SINCE SATURDAY. VERY SENSITIVE WHEN THINGS TOUCH IT, SPORTS BRA HAS BEEN RUBBING ON IT          ASSESSMENT/PLAN    Problem List           Diagnosed       Other    Abscess of back - Primary      Acute condition, new, Confirmed presence of abscess formation within the skin lesion per [facility] exam. I&D performed in the office today. Wound culture obtained. Start empiric coverage with 10-day course Doxycycline 100 mg BID. Follow-up in 1 week.         Relevant Medications    doxycycline hyclate (VIBRA-TABS) 100 MG tablet    Other Relevant Orders    Culture, Wound (with Gram Stain)         Patient was recommended to follow up with annual well health exam.    Return in about 1 week (around 4/9/2025) for Wound check.    Subjective   Deidre Abad is a 39 y.o. female being seen in clinic for skin lesion. Onset of symptom: 4 days ago. Symptoms include painful skin lesion on mid-back. Tenderness to light palpation reported, including when putting her sports bra on. Does look more red than usual. Does not believe there's any open sores, bleeding, or drainage.      Review of Systems  Per HPI    Social History     Tobacco Use    Smoking status: Some Days     Types: E-Cigarettes    Smokeless tobacco: Never   Vaping Use    Vaping status: Former    Substances: Unknown   Substance Use Topics    Alcohol use: Yes     Alcohol/week: 2.0 standard drinks of alcohol     Types: 2 Cans of beer per week     Comment: .1-2 drinks twice weekly    Drug use: No       Patient Active Problem List   Diagnosis    Anxiety    Current mild episode of major depressive disorder without prior episode    Tobacco abuse    Dermoid cyst of neck    Positive RIVERA (antinuclear antibody)    Pure hypercholesterolemia    Chronic fatigue    Abscess of back        Objective   Vitals:  /72 (BP Site: Right Upper Arm, Patient Position: Sitting, BP Cuff Size:

## 2025-04-02 NOTE — ASSESSMENT & PLAN NOTE
Acute condition, new, Confirmed presence of abscess formation within the skin lesion per [facility] exam. I&D performed in the office today. Wound culture obtained. Start empiric coverage with 10-day course Doxycycline 100 mg BID. Follow-up in 1 week.

## 2025-04-04 LAB
BACTERIA SPEC AEROBE CULT: NORMAL
GRAM STN SPEC: NORMAL

## 2025-04-06 ENCOUNTER — RESULTS FOLLOW-UP (OUTPATIENT)
Dept: FAMILY MEDICINE CLINIC | Age: 40
End: 2025-04-06

## 2025-04-09 ENCOUNTER — PROCEDURE VISIT (OUTPATIENT)
Dept: FAMILY MEDICINE CLINIC | Age: 40
End: 2025-04-09

## 2025-04-09 VITALS
OXYGEN SATURATION: 98 % | HEIGHT: 68 IN | BODY MASS INDEX: 25.61 KG/M2 | SYSTOLIC BLOOD PRESSURE: 110 MMHG | DIASTOLIC BLOOD PRESSURE: 70 MMHG | HEART RATE: 75 BPM | WEIGHT: 169 LBS

## 2025-04-09 DIAGNOSIS — L02.212 ABSCESS OF BACK: Primary | ICD-10-CM

## 2025-04-09 PROCEDURE — 99024 POSTOP FOLLOW-UP VISIT: CPT

## 2025-04-09 NOTE — ASSESSMENT & PLAN NOTE
New, at goal (stable), Improving overall. Iodoform was removed in the office today and placed 2 sutures to close the wound. Follow-up in 1 week. Continue the rest of Doxycycline 100 mg BID therapy as prescribed.

## 2025-04-09 NOTE — PROGRESS NOTES
25.70 kg/m²     Physical Exam:  Physical Exam  Vitals reviewed.   Constitutional:       General: She is not in acute distress.     Appearance: She is not ill-appearing.   HENT:      Head: Normocephalic and atraumatic.   Skin:     General: Skin is warm.      Findings: No erythema.             Comments: Well-healing incisional scar with iodoform in place. No active bleeding or pus drainage.   Neurological:      Mental Status: She is alert.       Iodoform in place without active drainage        BP Readings from Last 3 Encounters:   04/09/25 110/70   04/02/25 112/72   04/27/23 106/72     Wt Readings from Last 3 Encounters:   04/09/25 76.7 kg (169 lb)   04/02/25 79.8 kg (176 lb)   04/27/23 79.4 kg (175 lb)         The Body mass index is 25.7 kg/m²..         Plan was discussed with patient, who agrees. All questions were answered to patient's understanding and satisfaction.    Duane Farris MD  4/9/2025 10:08 AM

## 2025-04-16 ENCOUNTER — OFFICE VISIT (OUTPATIENT)
Dept: FAMILY MEDICINE CLINIC | Age: 40
End: 2025-04-16

## 2025-04-16 VITALS
SYSTOLIC BLOOD PRESSURE: 110 MMHG | HEIGHT: 68 IN | DIASTOLIC BLOOD PRESSURE: 70 MMHG | HEART RATE: 51 BPM | OXYGEN SATURATION: 100 % | WEIGHT: 171 LBS | BODY MASS INDEX: 25.91 KG/M2

## 2025-04-16 DIAGNOSIS — L02.212 ABSCESS OF BACK: Primary | ICD-10-CM

## 2025-04-16 PROCEDURE — 99024 POSTOP FOLLOW-UP VISIT: CPT

## 2025-04-16 NOTE — PROGRESS NOTES
Chief Complaint   Patient presents with    Wound Check     PT HERE FOR 1 WEEK WOUND CHECK PER DR. BOTELLO          ASSESSMENT/PLAN    Problem List           Diagnosed       Other    Abscess of back - Primary      New, at goal (stable), Resolved upon I&D. Sutures removed after confirming good wound healing. Overall reassuring.              Patient was recommended to follow up with annual well health exam.    Return in about 4 months (around 8/16/2025) for Annual physical exam.    Subjective   Deidre Abad is a 39 y.o. female being seen in clinic for suture removal. Underwent suture placement x 2 1 week ago after removing iodoform that was placed during the I&D procedure for infected abscess. Well-healing overall without any concerns at this time.    Review of Systems  Per HPI    Social History     Tobacco Use    Smoking status: Some Days     Types: E-Cigarettes    Smokeless tobacco: Never   Vaping Use    Vaping status: Former    Substances: Unknown   Substance Use Topics    Alcohol use: Yes     Alcohol/week: 2.0 standard drinks of alcohol     Types: 2 Cans of beer per week     Comment: .1-2 drinks twice weekly    Drug use: No       Patient Active Problem List   Diagnosis    Anxiety    Current mild episode of major depressive disorder without prior episode    Tobacco abuse    Dermoid cyst of neck    Positive RIVERA (antinuclear antibody)    Pure hypercholesterolemia    Chronic fatigue    Abscess of back        Objective   Vitals:  /70 (BP Site: Right Upper Arm, Patient Position: Sitting, BP Cuff Size: Medium Adult)   Pulse 51   Ht 1.727 m (5' 8\")   Wt 77.6 kg (171 lb)   LMP 04/11/2025 (Within Days)   SpO2 100%   BMI 26.00 kg/m²     Physical Exam:  Physical Exam  Vitals reviewed.   Constitutional:       General: She is not in acute distress.     Appearance: She is not ill-appearing.   HENT:      Head: Normocephalic and atraumatic.   Skin:     General: Skin is warm and dry.      Capillary Refill: Capillary refill

## 2025-04-16 NOTE — ASSESSMENT & PLAN NOTE
New, at goal (stable), Resolved upon I&D. Sutures removed after confirming good wound healing. Overall reassuring.